# Patient Record
Sex: FEMALE | Race: WHITE | NOT HISPANIC OR LATINO | Employment: UNEMPLOYED | URBAN - METROPOLITAN AREA
[De-identification: names, ages, dates, MRNs, and addresses within clinical notes are randomized per-mention and may not be internally consistent; named-entity substitution may affect disease eponyms.]

---

## 2017-01-09 ENCOUNTER — HOSPITAL ENCOUNTER (EMERGENCY)
Facility: MEDICAL CENTER | Age: 27
End: 2017-01-09
Attending: EMERGENCY MEDICINE
Payer: MEDICAID

## 2017-01-09 VITALS
TEMPERATURE: 99.9 F | HEIGHT: 68 IN | DIASTOLIC BLOOD PRESSURE: 69 MMHG | WEIGHT: 178.35 LBS | BODY MASS INDEX: 27.03 KG/M2 | SYSTOLIC BLOOD PRESSURE: 131 MMHG | OXYGEN SATURATION: 97 % | HEART RATE: 93 BPM | RESPIRATION RATE: 18 BRPM

## 2017-01-09 DIAGNOSIS — J03.90 EXUDATIVE TONSILLITIS: ICD-10-CM

## 2017-01-09 PROCEDURE — 99283 EMERGENCY DEPT VISIT LOW MDM: CPT | Mod: EDC

## 2017-01-09 RX ORDER — AMOXICILLIN 500 MG/1
500 CAPSULE ORAL 3 TIMES DAILY
Qty: 30 CAP | Refills: 0 | Status: SHIPPED | OUTPATIENT
Start: 2017-01-09

## 2017-01-09 ASSESSMENT — ENCOUNTER SYMPTOMS
VOMITING: 0
FEVER: 0
CHILLS: 1
COUGH: 1
SORE THROAT: 1

## 2017-01-09 ASSESSMENT — PAIN SCALES - GENERAL: PAINLEVEL_OUTOF10: 8

## 2017-01-09 NOTE — ED AVS SNAPSHOT
1/9/2017          Isis Garcia  [address]  [facility] [address]    Dear Isis:    [facility] wants to ensure your discharge home is safe and you or your loved ones have had all your questions answered regarding your care after you leave the hospital.    You may receive a telephone call within two days of your discharge.  This call is to make certain you understand your discharge instructions as well as ensure we provided you with the best care possible during your stay with us.     The call will only last approximately 3-5 minutes and will be done by a nurse.    Once again, we want to ensure your discharge home is safe and that you have a clear understanding of any next steps in your care.  If you have any questions or concerns, please do not hesitate to contact us, we are here for you.  Thank you for choosing [facility] for your healthcare needs.    Sincerely,    Gregorio Govea    [facility]

## 2017-01-09 NOTE — ED NOTES
"Isis Garcia 26 y.o. female ambulatory to triage for     Chief Complaint   Patient presents with   • Sore Throat     starting yesterday morning upon waking   • Chills     pt reports she has not measured temperature but has had chills   • Cough     last week, has resolved since   • Ear Pain     bilateral, R > L     /69 mmHg  Pulse 93  Temp(Src) 37.7 °C (99.9 °F)  Resp 18  Ht 1.737 m (5' 8.4\")  Wt 80.9 kg (178 lb 5.6 oz)  BMI 26.81 kg/m2  SpO2 97%  Pt asked to return to the lobby to await bed assignment.  Advised to return to the triage desk for any changes or concerns.  "

## 2017-01-09 NOTE — ED AVS SNAPSHOT
Rasmussen Reports Access Code: [email or web address]  Expires: 2/8/2017 10:38 AM    Rasmussen Reports  A secure, online tool to manage your health information     [employer]’s Rasmussen Reports® is a secure, online tool that connects you to your personalized health information from the privacy of your home -- day or night - making it very easy for you to manage your healthcare. Once the activation process is completed, you can even access your medical information using the Rasmussen Reports angel luis, which is available for free in the Apple Angel Luis store or Google Play store.     Rasmussen Reports provides the following levels of access (as shown below):   My Chart Features   [facility] Primary Care Doctor [facility]  Specialists [facility]  Urgent  Care Non-[facility]  Primary Care  Doctor   Email your healthcare team securely and privately 24/7 X X X X   Manage appointments: schedule your next appointment; view details of past/upcoming appointments X      Request prescription refills. X      View recent personal medical records, including lab and immunizations X X X X   View health record, including health history, allergies, medications X X X X   Read reports about your outpatient visits, procedures, consult and ER notes X X X X   See your discharge summary, which is a recap of your hospital and/or ER visit that includes your diagnosis, lab results, and care plan. X X       How to register for Rasmussen Reports:  1. Go to  [email or web address].  2. Click on the Sign Up Now box, which takes you to the New Member Sign Up page. You will need to provide the following information:  a. Enter your Rasmussen Reports Access Code exactly as it appears at the top of this page. (You will not need to use this code after you’ve completed the sign-up process. If you do not sign up before the expiration date, you must request a new code.)   b. Enter your date of birth.   c. Enter your home email address.   d. Click Submit, and follow the next screen’s instructions.  3. Create a Rasmussen Reports ID. This will be your Rasmussen Reports  login ID and cannot be changed, so think of one that is secure and easy to remember.  4. Create a [employer] password. You can change your password at any time.  5. Enter your Password Reset Question and Answer. This can be used at a later time if you forget your password.   6. Enter your e-mail address. This allows you to receive e-mail notifications when new information is available in [employer].  7. Click Sign Up. You can now view your health information.    For assistance activating your [employer] account, call [phone]

## 2017-01-09 NOTE — ED NOTES
Discharge instructions reviewed with pt regarding strep infection, Amoxicillin RX given.  Pt instructed on signs and symptoms to return to ED, no questions regarding this.   Instructed to follow-up with   Pcp Pt States None    Schedule an appointment as soon as possible for a visit in 1 week  As needed, Return if any symptoms worsen    .  Pt has no questions at this time, VSS.  Pt leaves alert, age appropriate and in NAD.

## 2017-01-09 NOTE — ED NOTES
Agree with triage note.  Redness, swelling and white patches noted to throat.  ERP at bedside.  Lungs CTA

## 2017-01-09 NOTE — ED AVS SNAPSHOT
After Visit Summary                                                                                                                Isis Garcia   MRN: [id number]    Department:  [facility], Emergency Dept   Date of Visit:  1/9/2017            [facility], Emergency Dept    [address]    [place] [address]    Phone:  [phone]      You were seen by     Camilo Garcia M.D.      Your Diagnosis Was     Exudative tonsillitis     J03.90       Follow-up Information     1. Follow up with Pcp Pt States None. Schedule an appointment as soon as possible for a visit in 1 week.    Specialty:  Family Medicine    Why:  As needed, Return if any symptoms worsen      Medication Information     Review all of your home medications and newly ordered medications with your primary doctor and/or pharmacist as soon as possible. Follow medication instructions as directed by your doctor and/or pharmacist.     Please keep your complete medication list with you and share with your physician. Update the information when medications are discontinued, doses are changed, or new medications (including over-the-counter products) are added; and carry medication information at all times in the event of emergency situations.               Medication List      START taking these medications        Instructions    amoxicillin 500 MG Caps   Commonly known as:  AMOXIL    Take 1 Cap by mouth 3 times a day.   Dose:  500 mg         ASK your doctor about these medications        Instructions    ibuprofen 800 MG Tabs   Commonly known as:  MOTRIN    Take 1 Tab by mouth every 8 hours as needed for Mild Pain (Cramping).   Dose:  800 mg       oxycodone-acetaminophen 5-325 MG Tabs   Commonly known as:  PERCOCET    Take 1 Tab by mouth every four hours as needed for Moderate Pain ((Pain Scale 4-6); If acetaminophen ineffective).   Dose:  1 Tab                 Discharge Instructions       Tonsillitis  Tonsillitis is  an infection of the throat that causes the tonsils to become red, tender, and swollen. Tonsils are collections of lymphoid tissue at the back of the throat. Each tonsil has crevices (crypts). Tonsils help fight nose and throat infections and keep infection from spreading to other parts of the body for the first 18 months of life.   CAUSES  Sudden (acute) tonsillitis is usually caused by infection with streptococcal bacteria. Long-lasting (chronic) tonsillitis occurs when the crypts of the tonsils become filled with pieces of food and bacteria, which makes it easy for the tonsils to become repeatedly infected.  SYMPTOMS   Symptoms of tonsillitis include:  · A sore throat, with possible difficulty swallowing.  · White patches on the tonsils.  · Fever.  · Tiredness.  · New episodes of snoring during sleep, when you did not snore before.  · Small, foul-smelling, yellowish-white pieces of material (tonsilloliths) that you occasionally cough up or spit out. The tonsilloliths can also cause you to have bad breath.  DIAGNOSIS  Tonsillitis can be diagnosed through a physical exam. Diagnosis can be confirmed with the results of lab tests, including a throat culture.  TREATMENT   The goals of tonsillitis treatment include the reduction of the severity and duration of symptoms and prevention of associated conditions. Symptoms of tonsillitis can be improved with the use of steroids to reduce the swelling. Tonsillitis caused by bacteria can be treated with antibiotic medicines. Usually, treatment with antibiotic medicines is started before the cause of the tonsillitis is known. However, if it is determined that the cause is not bacterial, antibiotic medicines will not treat the tonsillitis. If attacks of tonsillitis are severe and frequent, your health care provider may recommend surgery to remove the tonsils (tonsillectomy).  HOME CARE INSTRUCTIONS   · Rest as much as possible and get plenty of sleep.  · Drink plenty of fluids.  While the throat is very sore, eat soft foods or liquids, such as sherbet, soups, or instant breakfast drinks.  · Eat frozen ice pops.  · Gargle with a warm or cold liquid to help soothe the throat. Mix 1/4 teaspoon of salt and 1/4 teaspoon of baking soda in 8 oz of water.  SEEK MEDICAL CARE IF:   · Large, tender lumps develop in your neck.  · A rash develops.  · A green, yellow-brown, or bloody substance is coughed up.  · You are unable to swallow liquids or food for 24 hours.  · You notice that only one of the tonsils is swollen.  SEEK IMMEDIATE MEDICAL CARE IF:   · You develop any new symptoms such as vomiting, severe headache, stiff neck, chest pain, or trouble breathing or swallowing.  · You have severe throat pain along with drooling or voice changes.  · You have severe pain, unrelieved with recommended medications.  · You are unable to fully open the mouth.  · You develop redness, swelling, or severe pain anywhere in the neck.  · You have a fever.  MAKE SURE YOU:   · Understand these instructions.  · Will watch your condition.  · Will get help right away if you are not doing well or get worse.     This information is not intended to replace advice given to you by your health care provider. Make sure you discuss any questions you have with your health care provider.     Document Released: 09/27/2006 Document Revised: 01/08/2016 Document Reviewed: 06/06/2014  [employer] Interactive Patient Education ©2016 [employer]            Patient Information     Patient Information    Following emergency treatment: all patient requiring follow-up care must return either to a private physician or a clinic if your condition worsens before you are able to obtain further medical attention, please return to the emergency room.     Billing Information    At [facility], we work to make the billing process streamlined for our patients.  Our Representatives are here to answer any questions you may have regarding your hospital  bill.  If you have insurance coverage and have supplied your insurance information to us, we will submit a claim to your insurer on your behalf.  Should you have any questions regarding your bill, we can be reached online or by phone as follows:  Online: You are able pay your bills online or live chat with our representatives about any billing questions you may have. We are here to help Monday - Friday from 8:00am to 7:30pm and 9:00am - 12:00pm on Saturdays.  Please visit [email or web address]  for more information.   Phone:  [phone] or [phone]    Please note that your emergency physician, surgeon, pathologist, radiologist, anesthesiologist, and other specialists are not employed by [facility] and will therefore bill separately for their services.  Please contact them directly for any questions concerning their bills at the numbers below:     Emergency Physician Services:  [phone]  [facility]:  [phone]  [facility]:  [phone]  [facility]:  [phone]    1. Your final bill may vary from the amount quoted upon discharge if all procedures are not complete at that time, or if your doctor has additional procedures of which we are not aware. You will receive an additional bill if you return to the Emergency Department at [facility] for suture removal regardless of the facility of which the sutures were placed.     2. Please arrange for settlement of this account at the emergency registration.    3. All self-pay accounts are due in full at the time of treatment.  If you are unable to meet this obligation then payment is expected within 4-5 days.     4. If you have had radiology studies (CT, X-ray, Ultrasound, MRI), you have received a preliminary result during your emergency department visit. Please contact the radiology department [phone] to receive a copy of your final result. Please discuss the Final  result with your primary physician or with the follow up physician provided.     Crisis Hotline:  [place] Crisis Hotline:  1-325-GDCJFAS or 1-475.948.6390  [place] Crisis Hotline:    1-793.515.1804 or 626-018-3949         ED Discharge Follow Up Questions    1. In order to provide you with very good care, we would like to follow up with a phone call in the next few days.  May we have your permission to contact you?     YES /  NO    2. What is the best phone number to call you? (       )_____-__________    3. What is the best time to call you?      Morning  /  Afternoon  /  Evening                   Patient Signature:  ____________________________________________________________    Date:  ____________________________________________________________

## 2017-01-09 NOTE — ED PROVIDER NOTES
ED Provider Note    Scribed for Camilo Garcia M.D. by Kristin Molina. 1/9/2017, 10:28 AM.    Primary care provider: Pcp Pt States None  Means of arrival: Walk-in  History obtained from: Patient  History limited by: None    CHIEF COMPLAINT  Chief Complaint   Patient presents with   • Sore Throat     starting yesterday morning upon waking   • Chills     pt reports she has not measured temperature but has had chills   • Cough     last week, has resolved since   • Ear Pain     bilateral, R > L       HPI  Isis Garcia is a 26 y.o. female who presents to the Emergency Department with worsening sore throat onset yesterday. The patient reports she initially experienced a cough last week that has since resolved. She began developing sore throat yesterday morning that worsened throughout last night with the development of chills and bilateral ear pain, right worse than left. She denies any fevers or vomiting. The patient states she has not been around any other family members who are experiencing similar cold-like symptoms.     REVIEW OF SYSTEMS  Review of Systems   Constitutional: Positive for chills. Negative for fever.   HENT: Positive for ear pain (Right worse than left) and sore throat.    Respiratory: Positive for cough (Resolved).    Gastrointestinal: Negative for vomiting.       PAST MEDICAL HISTORY   has a past medical history of Depression; Abnormal Pap smear; Headache(784.0); and Scoliosis.    SURGICAL HISTORY   has past surgical history that includes other cardiac surgery and dilation and evacuation (7/10/2011).    SOCIAL HISTORY  Social History   Substance Use Topics   • Smoking status: Former Smoker -- 4 years     Types: Cigarettes     Quit date: 03/01/2014   • Smokeless tobacco: Never Used      Comment: quit 2/2011, used to smoke about 2 cigs a day   • Alcohol Use: No      History   Drug Use No     Comment: LAST USED IN 2009       FAMILY HISTORY  Family History   Problem Relation Age of Onset   • Cancer  "Father      COLON   • Hypertension Sister    • Psychiatry Sister      bipolar disorder        CURRENT MEDICATIONS  No current facility-administered medications for this encounter.    Current outpatient prescriptions:   •  amoxicillin (AMOXIL) 500 MG Cap, Take 1 Cap by mouth 3 times a day., Disp: 30 Cap, Rfl: 0  •  ibuprofen (MOTRIN) 800 MG TABS, Take 1 Tab by mouth every 8 hours as needed for Mild Pain (Cramping)., Disp: 30 Tab, Rfl: 3  •  oxycodone-acetaminophen (PERCOCET) 5-325 MG TABS, Take 1 Tab by mouth every four hours as needed for Moderate Pain ((Pain Scale 4-6); If acetaminophen ineffective)., Disp: 15 Tab, Rfl: 0    ALLERGIES  Allergies   Allergen Reactions   • Nkda [No Known Drug Allergy]        PHYSICAL EXAM  VITAL SIGNS: /69 mmHg  Pulse 93  Temp(Src) 37.7 °C (99.9 °F)  Resp 18  Ht 1.737 m (5' 8.4\")  Wt 80.9 kg (178 lb 5.6 oz)  BMI 26.81 kg/m2  SpO2 97%    Constitutional: Well developed, Well nourished, No acute distress, Non-toxic appearance.   HENT: Normocephalic, Atraumatic, Bilateral external ears normal. Right TM slightly dull. Left TM normal. Posterior oropharynx slightly erythematous with slightly enlarged tonsils.   Eyes: Pupils are equal and round, conjunctiva is normal.   Neck: Supple. Anterior and posterior lymphadenopathy.   Cardiovascular: Regular rate and rhythm without murmurs gallops or rubs.   Thorax & Lungs: No respiratory distress. Breathing comfortably. Lungs are clear to auscultation bilaterally, there are no wheezes no rales. Chest wall is nontender.  Skin: Warm, Dry, No erythema,   Musculoskeletal: Good range of motion in all major joints. No tenderness to palpation or major deformities noted. Intact distal pulses, no clubbing, no cyanosis, no edema,   Neurologic: Alert & oriented x 3, Normal motor function, Normal sensory function, No focal deficits noted.   Psychiatric: Affect normal, Judgment normal, Mood normal.     COURSE & MEDICAL DECISION MAKING  Nursing notes, " VS, PMSFHx reviewed in chart.    10:31 AM - Patient seen and examined at bedside. Advised salt water gargle and medicate with Lozenges throat anesthesia. She can medicate with Tylenol and Motrin as needed to relieve pain. She is advised to follow up with her PCP. The patient will be discharged with Amoxicillin 500mg PO and should return if symptoms worsen or if new symptoms arise. The patient understands and agrees to plan.     It was noticed that the patient's blood pressure was greater than 120/80 on today's visit. At this point, most likely related to reactive hypertension, secondary to the emergency visit itself. I have recommend the patient followup with her primary care physician for recheck of her blood pressure.       Decision Making:  Patient does have an exudative tonsillitis. I'll treat empirically as if this is a strep pharyngitis.    The patient will return for new or worsening symptoms and is stable at the time of discharge.    DISPOSITION:  Patient will be discharged home in stable condition.    FOLLOW UP:  Your PCP    Schedule an appointment as soon as possible for a visit in 1 week  As needed, Return if any symptoms worsen    OUTPATIENT MEDICATIONS:  Discharge Medication List as of 1/9/2017 10:38 AM      START taking these medications    Details   amoxicillin (AMOXIL) 500 MG Cap Take 1 Cap by mouth 3 times a day., Disp-30 Cap, R-0, Print Rx Paper           FINAL IMPRESSION  1. Exudative tonsillitis       Kristin ROONEY (Handy), am scribing for, and in the presence of, Camilo Garcia M.D..    Electronically signed by: Kristin Mcadams), 1/9/2017    Camilo ROONEY M.D. personally performed the services described in this documentation, as scribed by Kristin Molina in my presence, and it is both accurate and complete.    The note accurately reflects work and decisions made by me.  Camilo Garcia  1/9/2017  11:49 AM

## 2020-11-28 NOTE — DISCHARGE INSTRUCTIONS
Tonsillitis  Tonsillitis is an infection of the throat that causes the tonsils to become red, tender, and swollen. Tonsils are collections of lymphoid tissue at the back of the throat. Each tonsil has crevices (crypts). Tonsils help fight nose and throat infections and keep infection from spreading to other parts of the body for the first 18 months of life.   CAUSES  Sudden (acute) tonsillitis is usually caused by infection with streptococcal bacteria. Long-lasting (chronic) tonsillitis occurs when the crypts of the tonsils become filled with pieces of food and bacteria, which makes it easy for the tonsils to become repeatedly infected.  SYMPTOMS   Symptoms of tonsillitis include:  · A sore throat, with possible difficulty swallowing.  · White patches on the tonsils.  · Fever.  · Tiredness.  · New episodes of snoring during sleep, when you did not snore before.  · Small, foul-smelling, yellowish-white pieces of material (tonsilloliths) that you occasionally cough up or spit out. The tonsilloliths can also cause you to have bad breath.  DIAGNOSIS  Tonsillitis can be diagnosed through a physical exam. Diagnosis can be confirmed with the results of lab tests, including a throat culture.  TREATMENT   The goals of tonsillitis treatment include the reduction of the severity and duration of symptoms and prevention of associated conditions. Symptoms of tonsillitis can be improved with the use of steroids to reduce the swelling. Tonsillitis caused by bacteria can be treated with antibiotic medicines. Usually, treatment with antibiotic medicines is started before the cause of the tonsillitis is known. However, if it is determined that the cause is not bacterial, antibiotic medicines will not treat the tonsillitis. If attacks of tonsillitis are severe and frequent, your health care provider may recommend surgery to remove the tonsils (tonsillectomy).  HOME CARE INSTRUCTIONS   · Rest as much as possible and get plenty of  sleep.  · Drink plenty of fluids. While the throat is very sore, eat soft foods or liquids, such as sherbet, soups, or instant breakfast drinks.  · Eat frozen ice pops.  · Gargle with a warm or cold liquid to help soothe the throat. Mix 1/4 teaspoon of salt and 1/4 teaspoon of baking soda in 8 oz of water.  SEEK MEDICAL CARE IF:   · Large, tender lumps develop in your neck.  · A rash develops.  · A green, yellow-brown, or bloody substance is coughed up.  · You are unable to swallow liquids or food for 24 hours.  · You notice that only one of the tonsils is swollen.  SEEK IMMEDIATE MEDICAL CARE IF:   · You develop any new symptoms such as vomiting, severe headache, stiff neck, chest pain, or trouble breathing or swallowing.  · You have severe throat pain along with drooling or voice changes.  · You have severe pain, unrelieved with recommended medications.  · You are unable to fully open the mouth.  · You develop redness, swelling, or severe pain anywhere in the neck.  · You have a fever.  MAKE SURE YOU:   · Understand these instructions.  · Will watch your condition.  · Will get help right away if you are not doing well or get worse.     This information is not intended to replace advice given to you by your health care provider. Make sure you discuss any questions you have with your health care provider.     Document Released: 09/27/2006 Document Revised: 01/08/2016 Document Reviewed: 06/06/2014  [employer] Interactive Patient Education ©2016 [employer]     No